# Patient Record
Sex: MALE | Race: BLACK OR AFRICAN AMERICAN | Employment: FULL TIME | URBAN - METROPOLITAN AREA
[De-identification: names, ages, dates, MRNs, and addresses within clinical notes are randomized per-mention and may not be internally consistent; named-entity substitution may affect disease eponyms.]

---

## 2018-12-01 ENCOUNTER — HOSPITAL ENCOUNTER (EMERGENCY)
Age: 38
Discharge: HOME | End: 2018-12-01
Attending: EMERGENCY MEDICINE
Payer: OTHER MISCELLANEOUS

## 2018-12-01 ENCOUNTER — APPOINTMENT (OUTPATIENT)
Dept: GENERAL RADIOLOGY | Age: 38
End: 2018-12-01
Attending: EMERGENCY MEDICINE
Payer: OTHER MISCELLANEOUS

## 2018-12-01 VITALS
TEMPERATURE: 98 F | WEIGHT: 280 LBS | DIASTOLIC BLOOD PRESSURE: 87 MMHG | HEIGHT: 75 IN | SYSTOLIC BLOOD PRESSURE: 129 MMHG | OXYGEN SATURATION: 99 % | HEART RATE: 87 BPM | RESPIRATION RATE: 18 BRPM | BODY MASS INDEX: 34.82 KG/M2
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
## 2018-12-01 DIAGNOSIS — M25.561 ACUTE PAIN OF RIGHT KNEE: ICD-10-CM

## 2018-12-01 PROCEDURE — 99284 EMERGENCY DEPT VISIT MOD MDM: CPT

## 2018-12-01 PROCEDURE — 74011250636 HC RX REV CODE- 250/636: Performed by: EMERGENCY MEDICINE

## 2018-12-01 PROCEDURE — 96372 THER/PROPH/DIAG INJ SC/IM: CPT

## 2018-12-01 PROCEDURE — 73562 X-RAY EXAM OF KNEE 3: CPT

## 2018-12-01 PROCEDURE — 74011250637 HC RX REV CODE- 250/637: Performed by: EMERGENCY MEDICINE

## 2018-12-01 RX ORDER — KETOROLAC TROMETHAMINE 10 MG/1
10 TABLET, FILM COATED ORAL
Qty: 15 TAB | Refills: 0 | Status: SHIPPED | OUTPATIENT
Start: 2018-12-01 | End: 2019-04-21

## 2018-12-01 RX ORDER — KETOROLAC TROMETHAMINE 30 MG/ML
60 INJECTION, SOLUTION INTRAMUSCULAR; INTRAVENOUS
Status: COMPLETED | OUTPATIENT
Start: 2018-12-01 | End: 2018-12-01

## 2018-12-01 RX ORDER — TRAMADOL HYDROCHLORIDE 50 MG/1
50 TABLET ORAL
Qty: 15 TAB | Refills: 0 | Status: SHIPPED | OUTPATIENT
Start: 2018-12-01 | End: 2019-04-21

## 2018-12-01 RX ORDER — HYDROCODONE BITARTRATE AND ACETAMINOPHEN 7.5; 325 MG/1; MG/1
1 TABLET ORAL
Status: COMPLETED | OUTPATIENT
Start: 2018-12-01 | End: 2018-12-01

## 2018-12-01 RX ADMIN — HYDROCODONE BITARTRATE AND ACETAMINOPHEN 1 TABLET: 7.5; 325 TABLET ORAL at 11:58

## 2018-12-01 RX ADMIN — KETOROLAC TROMETHAMINE 60 MG: 30 INJECTION, SOLUTION INTRAMUSCULAR at 11:59
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
## 2019-04-21 ENCOUNTER — HOSPITAL ENCOUNTER (EMERGENCY)
Age: 39
Discharge: HOME | End: 2019-04-21
Attending: EMERGENCY MEDICINE | Admitting: EMERGENCY MEDICINE
Payer: COMMERCIAL

## 2019-04-21 VITALS
WEIGHT: 296.52 LBS | TEMPERATURE: 98.1 F | RESPIRATION RATE: 16 BRPM | HEART RATE: 68 BPM | SYSTOLIC BLOOD PRESSURE: 129 MMHG | OXYGEN SATURATION: 100 % | DIASTOLIC BLOOD PRESSURE: 83 MMHG | BODY MASS INDEX: 37.06 KG/M2

## 2019-04-21 DIAGNOSIS — T16.2XXA FOREIGN BODY OF LEFT EAR, INITIAL ENCOUNTER: ICD-10-CM

## 2019-04-21 DIAGNOSIS — H60.391 INFECTIOUS OTITIS EXTERNA, RIGHT: ICD-10-CM

## 2019-04-21 DIAGNOSIS — H61.23 BILATERAL IMPACTED CERUMEN: Primary | ICD-10-CM

## 2019-04-21 PROCEDURE — 74011250637 HC RX REV CODE- 250/637: Performed by: PHYSICIAN ASSISTANT

## 2019-04-21 PROCEDURE — 99283 EMERGENCY DEPT VISIT LOW MDM: CPT

## 2019-04-21 PROCEDURE — 76010010392 HC REMOVAL IMPACTED WAX IRRIGATION/LVG UNI

## 2019-04-21 RX ADMIN — Medication 5 DROP: at 11:07
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
## 2019-04-21 NOTE — DISCHARGE INSTRUCTIONS
[facility] SYSTEMS Departments     For adult and child immunizations, family planning, TB screening, STD testing and women's health services. [facility]: [place] [phone]      [facility] 25   [address]   [address]   170 [facility]: [place] [address] [facility] [address] [phone]      [address]          [place] [place] [address]     For primary care services, woman and child wellness, and some clinics providing specialty care. [facility] -- [address] [facility]. [facility]5 [facility] [phone] 411 [facility] 200 [facility] 3614 [facility] [phone]   339 [facility] [address] [phone]   [address] [facility] [employer] [address] [place] [address] 5850  Community  [phone]   770 [facility] [address] [address] [phone]   [facility] 81 [facility] [phone]   [facility] 1051 [facility] [phone]   [facility]: [facility] [address] [place] [address], [address] [phone]     [address] [phone]   [facility] Outreach [address]  Community  [phone]   [employer]  [address] ([email or web address]) [phone]         Sexual Health/Woman Wellness Clinics    For STD/HIV testing and treatment, pregnancy testing and services, men's health, birth control services, LGBT services, and hepatitis/HPV vaccine services. [facility] [employer] [place] [facility] [place] [employer] 201 [address]. [facility] [address] [facility] [address] [facility] 1579 [address] [phone]   [facility] [address], 5th floor [phone]   Pregnancy [address] for Women [address]  Leonides Majors [phone]         Specialty Service 1706 [facility]   [phone]   [place]   [phone]   Women, Infant and Children's Services: [address] [phone] 600 [facility]   [phone]   [address]   [facility]     [phone]   [address] Crisis   [address] [phone]     Local Primary Care Physicians  [facility] [phone]  MD Marika Melara MD [facility], MD [facility] Doctors [phone]  Ambreen Hurd, FNP  Carlos Manuel Hardy, MD Shweta Sanchez MD [address] [place] [address] [phone]  Delmi Wright, MD Tracy Teran MD [address] [facility] [phone]  MD Miguel Berrios, MD Lucas Pham, MD Gina Nation MD   [facility][1] [phone]  [facility] ANALIA KG, MD Kalyn Sagastume, NP [address] [phone]  Charles Wright, MD Flaquita Bauer, MD Karma Jacobson, MD Tani Quinonez, MD Skyler Lucero MD   62 93 [facility] MD [facility] [phone]  Marjan Mendoza, MD Junior Tapia, NP  Edwin Beth, MD Enzo Gusman, MD Denny Och, MD Romayne Gails, MD Lily Ball, MD   1866 [facility] [phone]  Ramsey Soulier, MD Nancye Catching, FNP  Melba Jhaveri, NP  MD Darlene Bartlett MD Catherene Miyamoto, MD Koren Ape, MD EPHRAIM [facility] [phone]  Shady Anders, MD Selina Virk, MD Shantel Paulson MD   [address] [phone]  MD Christelle Scott MD [place] [phone]  [place] Dandy, MD Aggie Kelp, MD Verlon Glen Юлия Murray, [address] [facility] [phone]  Para Radish, MD Cornel Rod, MD Temple Mola, MD Robinette Osier, MD Maximino Rubinstein, MD Anice Elders, LEONARDO Hutton MD 1120 [facility]   [phone]  MD Melissa Watkins MD Anda Marseilles, MD   2102 [facility] [phone] 1535 MD Leonid Castañeda, [facility]  Zonia Cargo, PACAITLIN  Zonia Cargo, P  Irine Margas, PA-C Florance Grapes, MD Denver Leu, LEONARDO Domingo, DO Miscellaneous:  Jessie Latham [phone]        Patient Education        Earwax Blockage: Care Instructions  Your Care Instructions    Earwax is a natural substance that protects the ear canal. Normally, earwax drains from the ears and does not cause problems. Sometimes earwax builds up and hardens. Earwax blockage (also called cerumen impaction) can cause some loss of hearing and pain. When wax is tightly packed, you will need to have your doctor remove it. Follow-up care is a key part of your treatment and safety. Be sure to make and go to all appointments, and call your doctor if you are having problems. It's also a good idea to know your test results and keep a list of the medicines you take. How can you care for yourself at home? · Do not try to remove earwax with cotton swabs, fingers, or other objects. This can make the blockage worse and damage the eardrum. · If your doctor recommends that you try to remove earwax at home:  ? Soften and loosen the earwax with warm mineral oil. You also can try hydrogen peroxide mixed with an equal amount of room temperature water. Place 2 drops of the fluid, warmed to body temperature, in the ear two times a day for up to 5 days. ? Once the wax is loose and soft, all that is usually needed to remove it from the ear canal is a gentle, warm shower.  Direct the water into the ear, then tip your head to let the earwax drain out. Dry your ear thoroughly with a hair dryer set on low. Hold the dryer several inches from your ear. ? If the warm mineral oil and shower do not work, use an over-the-counter wax softener. Read and follow all instructions on the label. After using the wax softener, use an ear syringe to gently flush the ear. Make sure the flushing solution is body temperature. Cool or hot fluids in the ear can cause dizziness. When should you call for help? Call your doctor now or seek immediate medical care if:    · Pus or blood drains from your ear.     · Your ears are ringing or feel full.     · You have a loss of hearing.    Watch closely for changes in your health, and be sure to contact your doctor if:    · You have pain or reduced hearing after 1 week of home treatment.     · You have any new symptoms, such as nausea or balance problems. Where can you learn more? Go to http://javier-edmund.info/. Enter S651 in the search box to learn more about \"Earwax Blockage: Care Instructions. \"  Current as of: September 23, 2018  Content Version: 11.9  © 7795-1090 [employer]. Care instructions adapted under license by [employer] (which disclaims liability or warranty for this information). If you have questions about a medical condition or this instruction, always ask your healthcare professional. [address] any warranty or liability for your use of this information.

## 2019-10-01 ENCOUNTER — HOSPITAL ENCOUNTER (OUTPATIENT)
Dept: ULTRASOUND IMAGING | Age: 39
Discharge: HOME | End: 2019-10-01
Attending: UROLOGY
Payer: COMMERCIAL

## 2019-10-01 DIAGNOSIS — N44.8 OTHER NONINFLAMMATORY DISORDERS OF THE TESTIS: ICD-10-CM

## 2019-10-01 PROCEDURE — 76870 US EXAM SCROTUM: CPT

## 2023-12-01 NOTE — DISCHARGE INSTRUCTIONS
Knee Pain or Injury: Care Instructions  Your Care Instructions    Injuries are a common cause of knee problems. Sudden (acute) injuries may be caused by a direct blow to the knee. They can also be caused by abnormal twisting, bending, or falling on the knee. Pain, bruising, or swelling may be severe, and may start within minutes of the injury. Overuse is another cause of knee pain. Other causes are climbing stairs, kneeling, and other activities that use the knee. Everyday wear and tear, especially as you get older, also can cause knee pain. Rest, along with home treatment, often relieves pain and allows your knee to heal. If you have a serious knee injury, you may need tests and treatment. Follow-up care is a key part of your treatment and safety. Be sure to make and go to all appointments, and call your doctor if you are having problems. It's also a good idea to know your test results and keep a list of the medicines you take. How can you care for yourself at home? · Be safe with medicines. Read and follow all instructions on the label. ? If the doctor gave you a prescription medicine for pain, take it as prescribed. ? If you are not taking a prescription pain medicine, ask your doctor if you can take an over-the-counter medicine. · Rest and protect your knee. Take a break from any activity that may cause pain. · Put ice or a cold pack on your knee for 10 to 20 minutes at a time. Put a thin cloth between the ice and your skin. · Prop up a sore knee on a pillow when you ice it or anytime you sit or lie down for the next 3 days. Try to keep it above the level of your heart. This will help reduce swelling. · If your knee is not swollen, you can put moist heat, a heating pad, or a warm cloth on your knee. · If your doctor recommends an elastic bandage, sleeve, or other type of support for your knee, wear it as directed.   · Follow your doctor's instructions about how much weight you can put on your leg. Use a cane, crutches, or a walker as instructed. · Follow your doctor's instructions about activity during your healing process. If you can do mild exercise, slowly increase your activity. · Reach and stay at a healthy weight. Extra weight can strain the joints, especially the knees and hips, and make the pain worse. Losing even a few pounds may help. When should you call for help? Call 911 anytime you think you may need emergency care. For example, call if:    · You have symptoms of a blood clot in your lung (called a pulmonary embolism). These may include:  ? Sudden chest pain. ? Trouble breathing. ? Coughing up blood.    Call your doctor now or seek immediate medical care if:    · You have severe or increasing pain.     · Your leg or foot turns cold or changes color.     · You cannot stand or put weight on your knee.     · Your knee looks twisted or bent out of shape.     · You cannot move your knee.     · You have signs of infection, such as:  ? Increased pain, swelling, warmth, or redness. ? Red streaks leading from the knee. ? Pus draining from a place on your knee. ? A fever.     · You have signs of a blood clot in your leg (called a deep vein thrombosis), such as:  ? Pain in your calf, back of the knee, thigh, or groin. ? Redness and swelling in your leg or groin.    Watch closely for changes in your health, and be sure to contact your doctor if:    · You have tingling, weakness, or numbness in your knee.     · You have any new symptoms, such as swelling.     · You have bruises from a knee injury that last longer than 2 weeks.     · You do not get better as expected. Where can you learn more? Go to http://javier-edmund.info/. Enter K195 in the search box to learn more about \"Knee Pain or Injury: Care Instructions. \"  Current as of: November 20, 2017  Content Version: 11.8  © 0364-7002 [employer], [employer].  Care instructions adapted under license by [facility] (which disclaims liability or warranty for this information). If you have questions about a medical condition or this instruction, always ask your healthcare professional. [address] any warranty or liability for your use of this information. Joint Pain: Care Instructions  Your Care Instructions    Many people have small aches and pains from overuse or injury to muscles and joints. Joint injuries often happen during sports or recreation, work tasks, or projects around the home. An overuse injury can happen when you put too much stress on a joint or when you do an activity that stresses the joint over and over, such as using the computer or rowing a boat. You can take action at home to help your muscles and joints get better. You should feel better in 1 to 2 weeks, but it can take 3 months or more to heal completely. Follow-up care is a key part of your treatment and safety. Be sure to make and go to all appointments, and call your doctor if you are having problems. It's also a good idea to know your test results and keep a list of the medicines you take. How can you care for yourself at home? · Do not put weight on the injured joint for at least a day or two. · For the first day or two after an injury, do not take hot showers or baths, and do not use hot packs. The heat could make swelling worse. · Put ice or a cold pack on the sore joint for 10 to 20 minutes at a time. Try to do this every 1 to 2 hours for the next 3 days (when you are awake) or until the swelling goes down. Put a thin cloth between the ice and your skin. · Wrap the injury in an elastic bandage. Do not wrap it too tightly because this can cause more swelling. · Prop up the sore joint on a pillow when you ice it or anytime you sit or lie down during the next 3 days. Try to keep it above the level of your heart. This will help reduce swelling.   · Take an over-the-counter pain medicine, such as acetaminophen (Tylenol), ibuprofen (Advil, Motrin), or naproxen (Aleve). Read and follow all instructions on the label. · After 1 or 2 days of rest, begin moving the joint gently. While the joint is still healing, you can begin to exercise using activities that do not strain or hurt the painful joint. When should you call for help? Call your doctor now or seek immediate medical care if:    · You have signs of infection, such as:  ? Increased pain, swelling, warmth, and redness. ? Red streaks leading from the joint. ? A fever.    Watch closely for changes in your health, and be sure to contact your doctor if:    · Your movement or symptoms are not getting better after 1 to 2 weeks of home treatment. Where can you learn more? Go to http://javier-edmund.info/. Enter P205 in the search box to learn more about \"Joint Pain: Care Instructions. \"  Current as of: November 29, 2017  Content Version: 11.8  © 8866-2587 [employer]. Care instructions adapted under license by [employer] (which disclaims liability or warranty for this information). If you have questions about a medical condition or this instruction, always ask your healthcare professional. [place] [address] any warranty or liability for your use of this information. PAST SURGICAL HISTORY:  H/O arthroscopy of knee     S/P arthroscopy of left shoulder     S/P arthroscopy of right shoulder

## 2024-03-24 ENCOUNTER — HOSPITAL ENCOUNTER (EMERGENCY)
Facility: HOSPITAL | Age: 44
Discharge: HOME | End: 2024-03-24
Attending: EMERGENCY MEDICINE

## 2024-03-24 VITALS
SYSTOLIC BLOOD PRESSURE: 135 MMHG | DIASTOLIC BLOOD PRESSURE: 87 MMHG | HEIGHT: 75 IN | TEMPERATURE: 97.7 F | OXYGEN SATURATION: 97 % | BODY MASS INDEX: 35.91 KG/M2 | RESPIRATION RATE: 18 BRPM | WEIGHT: 288.8 LBS

## 2024-03-24 DIAGNOSIS — L02.219 CELLULITIS AND ABSCESS OF TRUNK: Primary | ICD-10-CM

## 2024-03-24 DIAGNOSIS — L03.319 CELLULITIS AND ABSCESS OF TRUNK: Primary | ICD-10-CM

## 2024-03-24 PROCEDURE — 6370000000 HC RX 637 (ALT 250 FOR IP): Performed by: EMERGENCY MEDICINE

## 2024-03-24 PROCEDURE — 10060 I&D ABSCESS SIMPLE/SINGLE: CPT

## 2024-03-24 PROCEDURE — 2500000003 HC RX 250 WO HCPCS: Performed by: EMERGENCY MEDICINE

## 2024-03-24 PROCEDURE — 99283 EMERGENCY DEPT VISIT LOW MDM: CPT

## 2024-03-24 RX ORDER — LIDOCAINE HCL/EPINEPHRINE/PF 2%-1:200K
20 VIAL (ML) INJECTION ONCE
Status: COMPLETED | OUTPATIENT
Start: 2024-03-24 | End: 2024-03-24

## 2024-03-24 RX ORDER — DOXYCYCLINE HYCLATE 100 MG
100 TABLET ORAL 2 TIMES DAILY
Qty: 20 TABLET | Refills: 0 | Status: SHIPPED | OUTPATIENT
Start: 2024-03-24 | End: 2024-04-03

## 2024-03-24 RX ADMIN — LIDOCAINE HYDROCHLORIDE,EPINEPHRINE BITARTRATE 20 ML: 20; .005 INJECTION, SOLUTION EPIDURAL; INFILTRATION; INTRACAUDAL; PERINEURAL at 01:46

## 2024-03-24 RX ADMIN — Medication 3 ML: at 01:45

## 2024-03-24 ASSESSMENT — LIFESTYLE VARIABLES
HOW OFTEN DO YOU HAVE A DRINK CONTAINING ALCOHOL: NEVER
HOW MANY STANDARD DRINKS CONTAINING ALCOHOL DO YOU HAVE ON A TYPICAL DAY: PATIENT DOES NOT DRINK

## 2024-03-24 ASSESSMENT — PAIN DESCRIPTION - ORIENTATION: ORIENTATION: MID

## 2024-03-24 ASSESSMENT — PAIN DESCRIPTION - LOCATION: LOCATION: BACK

## 2024-03-24 ASSESSMENT — PAIN DESCRIPTION - DESCRIPTORS: DESCRIPTORS: ACHING

## 2024-03-24 ASSESSMENT — PAIN SCALES - GENERAL: PAINLEVEL_OUTOF10: 10

## 2024-03-24 ASSESSMENT — PAIN - FUNCTIONAL ASSESSMENT: PAIN_FUNCTIONAL_ASSESSMENT: 0-10
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
## 2024-03-24 NOTE — DISCHARGE INSTRUCTIONS
You were seen in the emergency department for your symptoms.  The abscess on your back was drained. Please take motrin and tylenol for pain. Please use the antibiotics. Follow-up with your primary care doctor. Return for new or worsening symptoms at any time.

## 2024-03-24 NOTE — ED NOTES
Discharge instructions reviewed at this time. Patient verbalized understanding. Patient A&Ox4 and ambulatory at this time. Patient instructed on follow ups, medications and education and verbalized understanding. Updated vitals obtained. Patient discharge complete.